# Patient Record
Sex: MALE | Race: WHITE | NOT HISPANIC OR LATINO | Employment: FULL TIME | URBAN - METROPOLITAN AREA
[De-identification: names, ages, dates, MRNs, and addresses within clinical notes are randomized per-mention and may not be internally consistent; named-entity substitution may affect disease eponyms.]

---

## 2023-02-21 PROBLEM — R03.0 BLOOD PRESSURE ELEVATED WITHOUT HISTORY OF HTN: Status: ACTIVE | Noted: 2023-02-21

## 2023-02-21 PROBLEM — E03.9 HYPOTHYROID: Status: ACTIVE | Noted: 2023-02-21

## 2023-02-21 PROBLEM — I10 BENIGN ESSENTIAL HTN: Status: ACTIVE | Noted: 2023-02-21

## 2023-02-21 PROBLEM — R19.7 DIARRHEA: Status: ACTIVE | Noted: 2023-02-21

## 2023-02-21 PROBLEM — K58.9 IBS (IRRITABLE BOWEL SYNDROME): Status: ACTIVE | Noted: 2023-02-21

## 2023-02-21 PROBLEM — R10.9 ABDOMINAL DISCOMFORT: Status: ACTIVE | Noted: 2023-02-21

## 2023-02-21 PROBLEM — F41.9 ANXIETY DISORDER, UNSPECIFIED: Status: ACTIVE | Noted: 2023-02-21

## 2023-02-21 PROBLEM — E78.5 ELEVATED LIPIDS: Status: ACTIVE | Noted: 2023-02-21

## 2023-02-21 PROBLEM — K57.30 COLON, DIVERTICULOSIS: Status: ACTIVE | Noted: 2023-02-21

## 2023-02-21 PROBLEM — C61 ADENOCARCINOMA OF PROSTATE (MULTI): Status: ACTIVE | Noted: 2023-02-21

## 2023-02-21 RX ORDER — LEVOTHYROXINE SODIUM 50 UG/1
1 CAPSULE ORAL
COMMUNITY
End: 2024-01-11 | Stop reason: SDUPTHER

## 2023-02-21 RX ORDER — AMLODIPINE BESYLATE 5 MG/1
1 TABLET ORAL DAILY
COMMUNITY
Start: 2022-05-02 | End: 2023-06-02 | Stop reason: SDUPTHER

## 2023-03-13 DIAGNOSIS — E78.5 ELEVATED LIPIDS: ICD-10-CM

## 2023-03-13 DIAGNOSIS — I10 BENIGN ESSENTIAL HTN: ICD-10-CM

## 2023-03-13 DIAGNOSIS — E03.9 HYPOTHYROIDISM, UNSPECIFIED TYPE: ICD-10-CM

## 2023-03-19 ASSESSMENT — PROMIS GLOBAL HEALTH SCALE
RATE_QUALITY_OF_LIFE: EXCELLENT
RATE_PHYSICAL_HEALTH: VERY GOOD
RATE_SOCIAL_SATISFACTION: EXCELLENT
CARRYOUT_PHYSICAL_ACTIVITIES: COMPLETELY
CARRYOUT_SOCIAL_ACTIVITIES: EXCELLENT
EMOTIONAL_PROBLEMS: SOMETIMES
RATE_AVERAGE_FATIGUE: MILD
RATE_MENTAL_HEALTH: VERY GOOD
RATE_GENERAL_HEALTH: VERY GOOD
RATE_AVERAGE_PAIN: 2

## 2023-03-20 ENCOUNTER — LAB (OUTPATIENT)
Dept: LAB | Facility: LAB | Age: 58
End: 2023-03-20
Payer: COMMERCIAL

## 2023-03-20 DIAGNOSIS — E03.9 HYPOTHYROIDISM, UNSPECIFIED TYPE: ICD-10-CM

## 2023-03-20 DIAGNOSIS — E78.5 ELEVATED LIPIDS: ICD-10-CM

## 2023-03-20 DIAGNOSIS — I10 BENIGN ESSENTIAL HTN: ICD-10-CM

## 2023-03-20 LAB
BASOPHILS (10*3/UL) IN BLOOD BY AUTOMATED COUNT: 0.05 X10E9/L (ref 0–0.1)
BASOPHILS/100 LEUKOCYTES IN BLOOD BY AUTOMATED COUNT: 1 % (ref 0–2)
EOSINOPHILS (10*3/UL) IN BLOOD BY AUTOMATED COUNT: 0.43 X10E9/L (ref 0–0.7)
EOSINOPHILS/100 LEUKOCYTES IN BLOOD BY AUTOMATED COUNT: 8.8 % (ref 0–6)
ERYTHROCYTE DISTRIBUTION WIDTH (RATIO) BY AUTOMATED COUNT: 12 % (ref 11.5–14.5)
ERYTHROCYTE MEAN CORPUSCULAR HEMOGLOBIN CONCENTRATION (G/DL) BY AUTOMATED: 33.3 G/DL (ref 32–36)
ERYTHROCYTE MEAN CORPUSCULAR VOLUME (FL) BY AUTOMATED COUNT: 100 FL (ref 80–100)
ERYTHROCYTES (10*6/UL) IN BLOOD BY AUTOMATED COUNT: 4.5 X10E12/L (ref 4.5–5.9)
HEMATOCRIT (%) IN BLOOD BY AUTOMATED COUNT: 45 % (ref 41–52)
HEMOGLOBIN (G/DL) IN BLOOD: 15 G/DL (ref 13.5–17.5)
IMMATURE GRANULOCYTES/100 LEUKOCYTES IN BLOOD BY AUTOMATED COUNT: 0.2 % (ref 0–0.9)
LEUKOCYTES (10*3/UL) IN BLOOD BY AUTOMATED COUNT: 4.9 X10E9/L (ref 4.4–11.3)
LYMPHOCYTES (10*3/UL) IN BLOOD BY AUTOMATED COUNT: 1.36 X10E9/L (ref 1.2–4.8)
LYMPHOCYTES/100 LEUKOCYTES IN BLOOD BY AUTOMATED COUNT: 27.7 % (ref 13–44)
MONOCYTES (10*3/UL) IN BLOOD BY AUTOMATED COUNT: 0.37 X10E9/L (ref 0.1–1)
MONOCYTES/100 LEUKOCYTES IN BLOOD BY AUTOMATED COUNT: 7.5 % (ref 2–10)
NEUTROPHILS (10*3/UL) IN BLOOD BY AUTOMATED COUNT: 2.69 X10E9/L (ref 1.2–7.7)
NEUTROPHILS/100 LEUKOCYTES IN BLOOD BY AUTOMATED COUNT: 54.8 % (ref 40–80)
NRBC (PER 100 WBCS) BY AUTOMATED COUNT: 0 /100 WBC (ref 0–0)
PLATELETS (10*3/UL) IN BLOOD AUTOMATED COUNT: 298 X10E9/L (ref 150–450)
RBC, URINE: <1 /HPF (ref 0–5)
THYROTROPIN (MIU/L) IN SER/PLAS BY DETECTION LIMIT <= 0.05 MIU/L: 2.66 MIU/L (ref 0.44–3.98)
WBC, URINE: NORMAL /HPF (ref 0–5)

## 2023-03-20 PROCEDURE — 80053 COMPREHEN METABOLIC PANEL: CPT

## 2023-03-20 PROCEDURE — 36415 COLL VENOUS BLD VENIPUNCTURE: CPT

## 2023-03-20 PROCEDURE — 81001 URINALYSIS AUTO W/SCOPE: CPT

## 2023-03-20 PROCEDURE — 85025 COMPLETE CBC W/AUTO DIFF WBC: CPT

## 2023-03-20 PROCEDURE — 80061 LIPID PANEL: CPT

## 2023-03-20 PROCEDURE — 84443 ASSAY THYROID STIM HORMONE: CPT

## 2023-03-21 LAB
ALANINE AMINOTRANSFERASE (SGPT) (U/L) IN SER/PLAS: 17 U/L (ref 10–52)
ALBUMIN (G/DL) IN SER/PLAS: 4.6 G/DL (ref 3.4–5)
ALKALINE PHOSPHATASE (U/L) IN SER/PLAS: 49 U/L (ref 33–120)
ANION GAP IN SER/PLAS: 11 MMOL/L (ref 10–20)
ASPARTATE AMINOTRANSFERASE (SGOT) (U/L) IN SER/PLAS: 16 U/L (ref 9–39)
BILIRUBIN TOTAL (MG/DL) IN SER/PLAS: 0.4 MG/DL (ref 0–1.2)
CALCIUM (MG/DL) IN SER/PLAS: 10.1 MG/DL (ref 8.6–10.6)
CARBON DIOXIDE, TOTAL (MMOL/L) IN SER/PLAS: 31 MMOL/L (ref 21–32)
CHLORIDE (MMOL/L) IN SER/PLAS: 104 MMOL/L (ref 98–107)
CHOLESTEROL (MG/DL) IN SER/PLAS: 212 MG/DL (ref 0–199)
CHOLESTEROL IN HDL (MG/DL) IN SER/PLAS: 52.5 MG/DL
CHOLESTEROL/HDL RATIO: 4
CREATININE (MG/DL) IN SER/PLAS: 0.9 MG/DL (ref 0.5–1.3)
GFR MALE: >90 ML/MIN/1.73M2
GLUCOSE (MG/DL) IN SER/PLAS: 91 MG/DL (ref 74–99)
LDL: 135 MG/DL (ref 0–99)
POTASSIUM (MMOL/L) IN SER/PLAS: 5.2 MMOL/L (ref 3.5–5.3)
PROTEIN TOTAL: 7 G/DL (ref 6.4–8.2)
SODIUM (MMOL/L) IN SER/PLAS: 141 MMOL/L (ref 136–145)
TRIGLYCERIDE (MG/DL) IN SER/PLAS: 123 MG/DL (ref 0–149)
UREA NITROGEN (MG/DL) IN SER/PLAS: 14 MG/DL (ref 6–23)
VLDL: 25 MG/DL (ref 0–40)

## 2023-03-23 ENCOUNTER — OFFICE VISIT (OUTPATIENT)
Dept: PRIMARY CARE | Facility: CLINIC | Age: 58
End: 2023-03-23
Payer: COMMERCIAL

## 2023-03-23 VITALS
DIASTOLIC BLOOD PRESSURE: 80 MMHG | WEIGHT: 150.8 LBS | OXYGEN SATURATION: 98 % | HEART RATE: 79 BPM | HEIGHT: 69 IN | RESPIRATION RATE: 18 BRPM | SYSTOLIC BLOOD PRESSURE: 130 MMHG | BODY MASS INDEX: 22.33 KG/M2

## 2023-03-23 DIAGNOSIS — R19.7 DIARRHEA, UNSPECIFIED TYPE: Primary | ICD-10-CM

## 2023-03-23 PROCEDURE — 3079F DIAST BP 80-89 MM HG: CPT | Performed by: INTERNAL MEDICINE

## 2023-03-23 PROCEDURE — 99396 PREV VISIT EST AGE 40-64: CPT | Performed by: INTERNAL MEDICINE

## 2023-03-23 PROCEDURE — 3075F SYST BP GE 130 - 139MM HG: CPT | Performed by: INTERNAL MEDICINE

## 2023-03-23 ASSESSMENT — ENCOUNTER SYMPTOMS
EYES NEGATIVE: 1
DEPRESSION: 0
OCCASIONAL FEELINGS OF UNSTEADINESS: 0
LOSS OF SENSATION IN FEET: 0
RESPIRATORY NEGATIVE: 1
CONSTITUTIONAL NEGATIVE: 1
ABDOMINAL PAIN: 1
ALLERGIC/IMMUNOLOGIC NEGATIVE: 1
PSYCHIATRIC NEGATIVE: 1
NEUROLOGICAL NEGATIVE: 1
DIARRHEA: 1
CARDIOVASCULAR NEGATIVE: 1
HEMATOLOGIC/LYMPHATIC NEGATIVE: 1
ENDOCRINE NEGATIVE: 1

## 2023-03-23 NOTE — PROGRESS NOTES
"Subjective   Patient ID: Bj Herndon is a 57 y.o. male who presents for Annual Exam.    CPE    57 M    CA Prostate     ? Functional GI Dz    Anxiety    HTN    Hypothyroid    HPI     Review of Systems   Constitutional: Negative.    HENT: Negative.     Eyes: Negative.    Respiratory: Negative.     Cardiovascular: Negative.    Gastrointestinal:  Positive for abdominal pain and diarrhea.   Endocrine: Negative.    Genitourinary: Negative.    Skin: Negative.    Allergic/Immunologic: Negative.    Neurological: Negative.    Hematological: Negative.    Psychiatric/Behavioral: Negative.         Objective   Pulse 79   Resp 18   Ht 1.753 m (5' 9\")   Wt 68.4 kg (150 lb 12.8 oz)   SpO2 98%   BMI 22.27 kg/m²     Physical Exam  Constitutional:       Appearance: Normal appearance.   HENT:      Head: Normocephalic and atraumatic.      Right Ear: Tympanic membrane, ear canal and external ear normal.      Left Ear: Tympanic membrane, ear canal and external ear normal.      Nose: Nose normal.      Mouth/Throat:      Mouth: Mucous membranes are moist.   Eyes:      Extraocular Movements: Extraocular movements intact.      Conjunctiva/sclera: Conjunctivae normal.      Pupils: Pupils are equal, round, and reactive to light.   Cardiovascular:      Rate and Rhythm: Normal rate and regular rhythm.      Pulses: Normal pulses.      Heart sounds: Normal heart sounds.   Pulmonary:      Effort: Pulmonary effort is normal.      Breath sounds: Normal breath sounds.   Abdominal:      General: Abdomen is flat. Bowel sounds are normal.      Palpations: Abdomen is soft.   Genitourinary:     Rectum: Normal.   Musculoskeletal:         General: Normal range of motion.      Cervical back: Normal range of motion.   Skin:     General: Skin is warm.   Neurological:      General: No focal deficit present.      Mental Status: He is alert and oriented to person, place, and time.   Psychiatric:         Mood and Affect: Mood normal.         Behavior: Behavior " normal.         Assessment/Plan          CA Prostate     ? Functional GI Dz     SIBO    Anxiety    HTN    Hypothyroid    Plan:    Breath Test    ? SSRI/TCA  for ? Functional bowel syndrome    Continue with current treatment.    Follow up in 12 months/PRN

## 2023-05-10 ENCOUNTER — TELEPHONE (OUTPATIENT)
Dept: PRIMARY CARE | Facility: CLINIC | Age: 58
End: 2023-05-10
Payer: COMMERCIAL

## 2023-05-10 NOTE — TELEPHONE ENCOUNTER
Patient said that the symptoms are getting worse and he was wondering if [facility] would be ok with him taking rifaxamin. He stated that he has seen his GI doctor and he was just told that he has IBS. He would like to try this medication for two weeks to see if it will help because he is losing weight, having soft stools and his appetite comes and goes. I informed patient that I will talk to the doctor and give him a call back.       Patient was identified with full name and date of birth.       Rosy Ram MA

## 2023-06-02 DIAGNOSIS — I10 BENIGN ESSENTIAL HTN: ICD-10-CM

## 2023-06-02 RX ORDER — AMLODIPINE BESYLATE 5 MG/1
5 TABLET ORAL DAILY
Qty: 30 TABLET | Refills: 2 | Status: SHIPPED | OUTPATIENT
Start: 2023-06-02 | End: 2023-09-01

## 2023-08-28 LAB — PROSTATE SPECIFIC AG (NG/ML) IN SER/PLAS: 0.32 NG/ML (ref 0–4)

## 2023-09-01 DIAGNOSIS — I10 BENIGN ESSENTIAL HTN: ICD-10-CM

## 2023-09-01 RX ORDER — AMLODIPINE BESYLATE 5 MG/1
5 TABLET ORAL DAILY
Qty: 90 TABLET | Refills: 1 | Status: SHIPPED | OUTPATIENT
Start: 2023-09-01 | End: 2024-01-11 | Stop reason: SDUPTHER

## 2024-01-11 ENCOUNTER — OFFICE VISIT (OUTPATIENT)
Dept: PRIMARY CARE | Facility: HOSPITAL | Age: 59
End: 2024-01-11
Payer: COMMERCIAL

## 2024-01-11 VITALS
WEIGHT: 143.3 LBS | BODY MASS INDEX: 22.49 KG/M2 | HEART RATE: 60 BPM | OXYGEN SATURATION: 98 % | SYSTOLIC BLOOD PRESSURE: 153 MMHG | HEIGHT: 67 IN | DIASTOLIC BLOOD PRESSURE: 68 MMHG | TEMPERATURE: 97.4 F

## 2024-01-11 DIAGNOSIS — I10 BENIGN ESSENTIAL HTN: ICD-10-CM

## 2024-01-11 DIAGNOSIS — C61 PROSTATE CANCER (MULTI): ICD-10-CM

## 2024-01-11 DIAGNOSIS — E03.9 HYPOTHYROIDISM, UNSPECIFIED TYPE: Primary | ICD-10-CM

## 2024-01-11 DIAGNOSIS — E78.5 ELEVATED LIPIDS: ICD-10-CM

## 2024-01-11 PROCEDURE — 3078F DIAST BP <80 MM HG: CPT | Performed by: INTERNAL MEDICINE

## 2024-01-11 PROCEDURE — 99215 OFFICE O/P EST HI 40 MIN: CPT | Performed by: INTERNAL MEDICINE

## 2024-01-11 PROCEDURE — 3077F SYST BP >= 140 MM HG: CPT | Performed by: INTERNAL MEDICINE

## 2024-01-11 PROCEDURE — 1036F TOBACCO NON-USER: CPT | Performed by: INTERNAL MEDICINE

## 2024-01-11 RX ORDER — AMLODIPINE BESYLATE 5 MG/1
5 TABLET ORAL DAILY
Qty: 30 TABLET | Refills: 11 | Status: SHIPPED | OUTPATIENT
Start: 2024-01-11

## 2024-01-11 RX ORDER — LEVOTHYROXINE SODIUM 50 UG/1
50 CAPSULE ORAL
Qty: 30 CAPSULE | Refills: 11 | Status: SHIPPED | OUTPATIENT
Start: 2024-01-11 | End: 2024-01-12

## 2024-01-11 ASSESSMENT — ENCOUNTER SYMPTOMS
DEPRESSION: 0
LOSS OF SENSATION IN FEET: 0
OCCASIONAL FEELINGS OF UNSTEADINESS: 0

## 2024-01-11 ASSESSMENT — PATIENT HEALTH QUESTIONNAIRE - PHQ9
2. FEELING DOWN, DEPRESSED OR HOPELESS: NOT AT ALL
1. LITTLE INTEREST OR PLEASURE IN DOING THINGS: NOT AT ALL
SUM OF ALL RESPONSES TO PHQ9 QUESTIONS 1 AND 2: 0

## 2024-01-11 NOTE — PATIENT INSTRUCTIONS
Take levothyroxine every other day for 2 weeks and then contact the office    Will continue taper if not having symptoms    Blood work after stopping levothyroxine

## 2024-01-11 NOTE — PROGRESS NOTES
Chief Complaint   Patient presents with    Establish Care     Medication refill         History Of Present Illness:    jB Herndon is a 58 y.o. male presenting to [facility] and refill medications.    Needs levothyroxine refilled.  Was diagnosed with hypothyroidism over 5 years ago.  Diagnosed on routine blood work.  Never really symptomatic.  Questions if he needs to continue taking the  medication.   He has been out of the medication for a week and does not feel any differently.  Has history of IBS-D.  Evaluated by GI in past.  Colonoscopy, CT abdomen pelvis and RUQ US.  Gallbladder polyps but not other concerning findings.  No response to anti-spasmodics.  Temp relief from course of Xifaxan.   Low-normal fecal elastase.  Ne response to creon.  Hx of PCa treated with brachytherapy.  Sees Dr. Shepard for follow up.      CT abd/pelvis 5/15/19  RUQUS 5/4/22  Several tiny gallbladder polyps measuring 3 mm or less in individual  diameter which are most likely benign. No shadowing gallstones or  wall thickening. No biliary dilation.    2/24/22  CAC 4  2019 brachytherapy for 3+4 PCa - some urgency, normal erections       Active Medical Problems:  Patient Active Problem List    Diagnosis Date Noted    Abdominal discomfort 02/21/2023    Prostate cancer (CMS/HCC) 02/21/2023    Benign essential HTN 02/21/2023    Blood pressure elevated without history of HTN 02/21/2023    Colon, diverticulosis 02/21/2023    Diarrhea 02/21/2023    Elevated lipids 02/21/2023    Hypothyroid 02/21/2023    IBS (irritable bowel syndrome) 02/21/2023    Anxiety disorder, unspecified 02/21/2023       Past Medical History:  Past Medical History:   Diagnosis Date    Disease of thyroid gland     hypothyroidism - diagnosed on routine blood work    Elevated LDL cholesterol level     CAC 0 - CAC 4 -2/24/22    Gallbladder polyp     Hypertension     Other conditions influencing health status     No significant past medical history    Prostate cancer  "(CMS/[facility]) 11/2019    shant 3+4 - Radiation - brachytherapy, ([facility], Dr. Eason, Dr Rosales) Dr. Shepard       Past Surgical History:  Past Surgical History:   Procedure Laterality Date    OTHER SURGICAL HISTORY  09/16/2021    Vasectomy    OTHER SURGICAL HISTORY  09/16/2021    Hernia repair - right inguinal    OTHER SURGICAL HISTORY  09/16/2021    Prostate brachytherapy         Social History:  Social History     Tobacco Use    Smoking status: Never    Smokeless tobacco: Never   Vaping Use    Vaping Use: Never used   Substance Use Topics    Alcohol use: Not Currently     Alcohol/week: 2.0 standard drinks of alcohol     Types: 2 Cans of beer per week     Comment: occasional    Drug use: Never         Family History:  Family History   Problem Relation Name Age of Onset    Dementia Mother      Other (prostate enlargement) Father      Diabetes type II Father      No Known Problems Daughter          Looking at [employer] [facility] is first choice    Other (acute angina) Maternal Great-Grandfather          Allergies:  Prednisone    Outpatient Medications:    Current Outpatient Medications:     amLODIPine (Norvasc) 5 mg tablet, Take 1 tablet (5 mg) by mouth once daily., Disp: 30 tablet, Rfl: 11    levothyroxine (LevoxyL) 25 mcg tablet, Take 2 tablets (50 mcg) by mouth once daily in the morning. Take before meals., Disp: 30 tablet, Rfl: 5    Review of Systems:  Pertinent positives in review of systems outlined above.  Complete ROS otherwise negative.           Last Recorded Vitals:  Vitals:    01/11/24 1111   BP: 153/68   BP Location: Left arm   Patient Position: Sitting   BP Cuff Size: Adult   Pulse: 60   Temp: 36.3 °C (97.4 °F)   SpO2: 98%   Weight: 65 kg (143 lb 4.8 oz)   Height: 1.702 m (5' 7\")   Body mass index is 22.44 kg/m².        Physical Exam  Vitals reviewed.   Constitutional:       Appearance: Normal appearance.   HENT:      Mouth/Throat:      Mouth: Mucous membranes are moist.      Pharynx: Oropharynx is " clear.   Eyes:      Extraocular Movements: Extraocular movements intact.      Conjunctiva/sclera: Conjunctivae normal.      Pupils: Pupils are equal, round, and reactive to light.   Neck:      Vascular: No carotid bruit.   Cardiovascular:      Rate and Rhythm: Normal rate and regular rhythm.   Pulmonary:      Effort: Pulmonary effort is normal.      Breath sounds: Normal breath sounds.   Musculoskeletal:      Cervical back: No tenderness.      Right lower leg: No edema.      Left lower leg: No edema.   Lymphadenopathy:      Cervical: No cervical adenopathy.   Neurological:      General: No focal deficit present.      Mental Status: He is alert and oriented to person, place, and time.   Psychiatric:         Mood and Affect: Mood normal.             Last Labs:    The 10-year ASCVD risk score (Hilda TIPTON, et al., 2019) is: 13.8%    Values used to calculate the score:      Age: 58 years      Sex: Male      Is Non- : No      Diabetic: No      Tobacco smoker: No      Systolic Blood Pressure: 153 mmHg      Is BP treated: Yes      HDL Cholesterol: 52.5 mg/dL      Total Cholesterol: 212 mg/dL        Assessment/Plan   Problem List Items Addressed This Visit       Prostate cancer (CMS/[facility])     PSA UTD.  Followed by Dr. Shepard         Benign essential HTN     BP elevated today.  May be anxious.  Will continue amlodipine and repeat BP in 3mo         Relevant Medications    amLODIPine (Norvasc) 5 mg tablet    Other Relevant Orders    Comprehensive Metabolic Panel    Elevated lipids     Fasting lipids due now.          Relevant Orders    Lipid Panel    Hypothyroid - Primary     Highest TSH in chart was 7.  Bj does not remember having any symptoms of underactive thryoid.  Off of medication and no change in how he is feeling.  Time course of IBS symptoms parallels dx and treatment of hypothyroidism.  ? If levothyroxine is contributing.   Will taper dose to every other day for 2 week and the check in.  If  not symptomatic, will continue full taper off of the levothyroxine.  Bj is interested to see how he feels off of the medication.   He agrees to the slow taper.          Relevant Orders    Comprehensive Metabolic Panel    TSH with reflex to Free T4 if abnormal    Free T4 Index    Thyroid Peroxidase (TPO) Antibody       A total of 60 min was spent reviewing the chart and recent testing and discussing plan of care.       Domingo Rowland MD

## 2024-01-12 DIAGNOSIS — E03.9 HYPOTHYROIDISM, UNSPECIFIED TYPE: ICD-10-CM

## 2024-01-12 RX ORDER — LEVOTHYROXINE SODIUM 25 UG/1
50 TABLET ORAL
Qty: 30 TABLET | Refills: 5 | Status: SHIPPED | OUTPATIENT
Start: 2024-01-12 | End: 2024-01-23 | Stop reason: SDUPTHER

## 2024-01-13 NOTE — ASSESSMENT & PLAN NOTE
Highest TSH in chart was 7.  Bj does not remember having any symptoms of underactive thryoid.  Off of medication and no change in how he is feeling.  Time course of IBS symptoms parallels dx and treatment of hypothyroidism.  ? If levothyroxine is contributing.   Will taper dose to every other day for 2 week and the check in.  If not symptomatic, will continue full taper off of the levothyroxine.  Bj is interested to see how he feels off of the medication.   He agrees to the slow taper.

## 2024-01-20 DIAGNOSIS — E03.9 HYPOTHYROIDISM, UNSPECIFIED TYPE: Primary | ICD-10-CM

## 2024-01-23 RX ORDER — LEVOTHYROXINE SODIUM 25 UG/1
50 TABLET ORAL
Qty: 60 TABLET | Refills: 0 | Status: SHIPPED | OUTPATIENT
Start: 2024-01-23 | End: 2024-03-13 | Stop reason: ALTCHOICE

## 2024-02-26 ENCOUNTER — LAB (OUTPATIENT)
Dept: LAB | Facility: LAB | Age: 59
End: 2024-02-26
Payer: COMMERCIAL

## 2024-02-26 DIAGNOSIS — C61 PROSTATE CANCER (MULTI): ICD-10-CM

## 2024-02-26 LAB — PSA SERPL-MCNC: 0.29 NG/ML

## 2024-02-26 PROCEDURE — 84479 ASSAY OF THYROID (T3 OR T4): CPT

## 2024-02-26 PROCEDURE — 84439 ASSAY OF FREE THYROXINE: CPT

## 2024-02-26 PROCEDURE — 84153 ASSAY OF PSA TOTAL: CPT

## 2024-02-26 PROCEDURE — 36415 COLL VENOUS BLD VENIPUNCTURE: CPT

## 2024-02-26 PROCEDURE — 80053 COMPREHEN METABOLIC PANEL: CPT

## 2024-02-26 PROCEDURE — 86376 MICROSOMAL ANTIBODY EACH: CPT

## 2024-02-26 PROCEDURE — 80061 LIPID PANEL: CPT

## 2024-02-26 PROCEDURE — 84436 ASSAY OF TOTAL THYROXINE: CPT

## 2024-02-26 PROCEDURE — 84443 ASSAY THYROID STIM HORMONE: CPT

## 2024-02-29 NOTE — PROGRESS NOTES
Virtual or Telephone Consent    An interactive audio and video telecommunication system which permits real time communications between the patient (at the originating site) and provider (at the distant site) was utilized to provide this telehealth service.   Verbal consent was requested and obtained from Bj Herndon on this date, 03/01/24 for a telehealth visit.     FUV    Last seen - 8/31/23     HISTORY OF PRESENT ILLNESS:   Bj Herndon is a 58 y.o. male who is being seen today for 6 MONTH FUV     -history of prostate cancer, diagnosed with GS 3+4=7 in 2019 treated with radiation     PSA trend:  -0.29 on 2/26/24  -0.32 on 8/28/23,   -0.53 on 2/1/23,  -0.61 on 7/13/2022     PAST MEDICAL HISTORY:  Past Medical History:   Diagnosis Date    Disease of thyroid gland     hypothyroidism - diagnosed on routine blood work    Elevated LDL cholesterol level     CAC 0 - CAC 4 -2/24/22    Gallbladder polyp     Hypertension     Other conditions influencing health status     No significant past medical history    Prostate cancer (CMS/[facility]) 11/2019    shant 3+4 - Radiation - brachytherapy, ([facility], Dr. Eason, Dr Rosales) Dr. Shepard       PAST SURGICAL HISTORY:  Past Surgical History:   Procedure Laterality Date    OTHER SURGICAL HISTORY  09/16/2021    Vasectomy    OTHER SURGICAL HISTORY  09/16/2021    Hernia repair - right inguinal    OTHER SURGICAL HISTORY  09/16/2021    Prostate brachytherapy        ALLERGIES:   Allergies   Allergen Reactions    Prednisone Confusion     Treatment for otitis followed by hearing loss - agitated,coulnd't sleep        MEDICATIONS:   Current Outpatient Medications   Medication Instructions    amLODIPine (NORVASC) 5 mg, oral, Daily    levothyroxine (SYNTHROID, LEVOXYL) 50 mcg, oral, Daily before breakfast        PHYSICAL EXAM:  There were no vitals taken for this visit.  Constitutional: Patient appears well-developed and well-nourished. No distress.    Pulmonary/Chest: Effort normal.  "No respiratory distress.   Abdominal: Soft, ND NT  : WNL  Musculoskeletal: Normal range of motion.    Neurological: Alert and oriented to person, place, and time.  Psychiatric: Normal mood and affect. Behavior is normal. Thought content normal.      Labs:  No results found for: \"TESTOSTERONE\"  Lab Results   Component Value Date    PSA 0.29 02/26/2024     No components found for: \"CBC\"  Lab Results   Component Value Date    CREATININE 0.91 02/26/2024     No components found for: \"TESTOTMS\"  No results found for: \"TESTF\"    Imaging:    Discussion: PSA Results reviewed with patient. Patient reassured. Denies hematuria, dysuria, nocturia, flank pain, and bothersome frequency or urgency. No new urinary complaints. Denies leakage. Follow up in 6 months with PSA prior.       Assessment:    No diagnosis found.    Bj Herndon is a 58 y.o. male here for FUV     Plan:   1) Follow up in 6 months with PSA prior.  All questions and concerns were addressed. Patient verbalizes understanding and has no other questions at this time.     Scribe Attestation  By signing my name below, IRenetta , [place]   attest that this documentation has been prepared under the direction and in the presence of Prashant Shepard MD.    "

## 2024-03-01 ENCOUNTER — TELEMEDICINE (OUTPATIENT)
Dept: UROLOGY | Facility: HOSPITAL | Age: 59
End: 2024-03-01
Payer: COMMERCIAL

## 2024-03-01 DIAGNOSIS — C61 MALIGNANT NEOPLASM OF PROSTATE (MULTI): Primary | ICD-10-CM

## 2024-03-01 PROCEDURE — G2211 COMPLEX E/M VISIT ADD ON: HCPCS | Performed by: UROLOGY

## 2024-03-01 PROCEDURE — 1036F TOBACCO NON-USER: CPT | Performed by: UROLOGY

## 2024-03-01 PROCEDURE — 99213 OFFICE O/P EST LOW 20 MIN: CPT | Performed by: UROLOGY

## 2024-03-13 ENCOUNTER — OFFICE VISIT (OUTPATIENT)
Dept: PRIMARY CARE | Facility: CLINIC | Age: 59
End: 2024-03-13
Payer: COMMERCIAL

## 2024-03-13 VITALS
TEMPERATURE: 97.8 F | WEIGHT: 146.2 LBS | DIASTOLIC BLOOD PRESSURE: 70 MMHG | HEART RATE: 55 BPM | OXYGEN SATURATION: 98 % | SYSTOLIC BLOOD PRESSURE: 110 MMHG | BODY MASS INDEX: 22.9 KG/M2

## 2024-03-13 DIAGNOSIS — E03.8 OTHER SPECIFIED HYPOTHYROIDISM: ICD-10-CM

## 2024-03-13 DIAGNOSIS — E03.9 HYPOTHYROIDISM, UNSPECIFIED TYPE: ICD-10-CM

## 2024-03-13 DIAGNOSIS — Z00.00 ROUTINE HEALTH MAINTENANCE: Primary | ICD-10-CM

## 2024-03-13 DIAGNOSIS — C61 PROSTATE CANCER (MULTI): ICD-10-CM

## 2024-03-13 DIAGNOSIS — I10 HYPERTENSION, UNSPECIFIED TYPE: ICD-10-CM

## 2024-03-13 PROBLEM — K82.4 GALLBLADDER POLYP: Status: ACTIVE | Noted: 2024-03-13

## 2024-03-13 PROBLEM — R03.0 BLOOD PRESSURE ELEVATED WITHOUT HISTORY OF HTN: Status: RESOLVED | Noted: 2023-02-21 | Resolved: 2024-03-13

## 2024-03-13 PROBLEM — E78.00 ELEVATED LDL CHOLESTEROL LEVEL: Status: RESOLVED | Noted: 2024-03-13 | Resolved: 2024-03-13

## 2024-03-13 PROBLEM — E78.00 ELEVATED LDL CHOLESTEROL LEVEL: Status: ACTIVE | Noted: 2024-03-13

## 2024-03-13 PROBLEM — E07.9 DISEASE OF THYROID GLAND: Status: ACTIVE | Noted: 2023-02-21

## 2024-03-13 PROCEDURE — 3074F SYST BP LT 130 MM HG: CPT | Performed by: INTERNAL MEDICINE

## 2024-03-13 PROCEDURE — 99396 PREV VISIT EST AGE 40-64: CPT | Performed by: INTERNAL MEDICINE

## 2024-03-13 PROCEDURE — 3078F DIAST BP <80 MM HG: CPT | Performed by: INTERNAL MEDICINE

## 2024-03-13 PROCEDURE — 1036F TOBACCO NON-USER: CPT | Performed by: INTERNAL MEDICINE

## 2024-03-13 NOTE — ASSESSMENT & PLAN NOTE
Bj discontinued levothyroxine, and he is asymptomatic.  His TSH is 7, but free T4 is normal.  He will remain off of levothyroxine, and thyroid studies will be repeated in 3 to 4 months

## 2024-03-13 NOTE — PATIENT INSTRUCTIONS
Schedule eye exam     Compression stockings 20-30mm Hg     Blood work again in 3 mo     Schedule shingles vaccine     Take some ibuprofen as needed for back pain    Call if pain does not improve

## 2024-03-13 NOTE — ASSESSMENT & PLAN NOTE
Blood pressure is under excellent control.  Continue amlodipine.  Repeat blood pressure in 6 months

## 2024-03-13 NOTE — PROGRESS NOTES
"Chief Complaint   Patient presents with    Follow-up         History Of Present Illness:    Bj Herndon is a 58 y.o. male presenting for yearly physical and update of health maintenance.     HISTORY OF PRESENT ILLNESS 1/11/24:  Was diagnosed with hypothyroidism over 5 years ago.  Diagnosed on routine blood work.  Never really symptomatic.  Has history of IBS-D.  Evaluated by GI in past.  Colonoscopy, CT abdomen pelvis and RUQ US.  Gallbladder polyps but not other concerning findings.  No response to anti-spasmodics.  Temp relief from course of Xifaxan.   Low-normal fecal elastase.  Ne response to creon.  Hx of PCa treated with brachytherapy.  Sees Dr. Shepard for follow up.      -CT abd/pelvis 5/15/19  RUQUS 5/4/22  Several tiny gallbladder polyps measuring 3 mm or less in individual  diameter which are most likely benign. No shadowing gallstones or  wall thickening. No biliary dilation.    -2/24/22  CAC 4  -2019 brachytherapy for 3+4 PCa - some urgency, normal erections     INTERVAL HISTORY 3/13/24  Levothyroxine discontinued a month ago. No change in energy , no symptoms of hypothyroidism.  Recent TSH 7.1, Free T4 0.86 (0.78-1.48)  Digestive symptoms have improved over the past few weeks - might be coincidence, but correlates with stopping thyroid medication.  Symptoms started Jan 2021 after arvind Covid.  Appetite is very good  [facility] has gym open to community -  exercising1-2 x per week, walking track at brisk pace, some weights   Not OOB, not having chest pain    No new urinary symptoms - occasional urgency   Normal erections  Some mid thoracic back pain described as \"fatigue\" by end of night when on feet.  Pain does not wake him from sleep.  Pain is non-radiating.       Active Medical Problems:  Patient Active Problem List    Diagnosis Date Noted    Gallbladder polyp 03/13/2024    Routine health maintenance 03/13/2024    Abdominal discomfort 02/21/2023    Prostate cancer (CMS/HCC) 02/21/2023    " Hypertension 02/21/2023    Colon, diverticulosis 02/21/2023    Diarrhea 02/21/2023    Elevated lipids 02/21/2023    Other specified hypothyroidism 02/21/2023    IBS (irritable bowel syndrome) 02/21/2023    Anxiety disorder, unspecified 02/21/2023       Past Medical History:  Past Medical History:   Diagnosis Date    Disease of thyroid gland     hypothyroidism - diagnosed on routine blood work    Elevated LDL cholesterol level     CAC 4 -2/24/22    Gallbladder polyp     Hypertension     Prostate cancer (CMS/HCC) 11/2019    shant 3+4 - Radiation - brachytherapy, ([facility], Dr. Eason, Dr Rosales) Dr. Shepard       Past Surgical History:  Past Surgical History:   Procedure Laterality Date    OTHER SURGICAL HISTORY  09/16/2021    Vasectomy    OTHER SURGICAL HISTORY      Hernia repair - right inguinal - 1 yo    OTHER SURGICAL HISTORY  11/2019    Prostate brachytherapy         Social History:  Social History     Tobacco Use    Smoking status: Never    Smokeless tobacco: Never   Vaping Use    Vaping Use: Never used   Substance Use Topics    Alcohol use: Not Currently     Alcohol/week: 2.0 standard drinks of alcohol     Types: 2 Cans of beer per week     Comment: occasional    Drug use: Never         Family History:  Family History   Problem Relation Name Age of Onset    Dementia Mother      Other (prostate enlargement) Father      Diabetes type II Father      No Known Problems Daughter          [employer] in the fall - animation major    Other (acute angina) Maternal Great-Grandfather          Allergies:  Prednisone    Outpatient Medications:    Current Outpatient Medications:     amLODIPine (Norvasc) 5 mg tablet, Take 1 tablet (5 mg) by mouth once daily., Disp: 30 tablet, Rfl: 11    berberine/herbal complex no.18 (BERBERINE-HERBAL COMB NO.18 ORAL), Take by mouth., Disp: , Rfl:     ergocalciferol, vitamin D2, (VITAMIN D2 ORAL), Take by mouth., Disp: , Rfl:     Review of Systems:  Pertinent positives in review of  systems outlined above.  Complete ROS otherwise negative.           Last Recorded Vitals:  Vitals:    03/13/24 1004 03/13/24 1043   BP: 143/76 110/70   BP Location: Right arm    Patient Position: Sitting    BP Cuff Size: Adult    Pulse: 55    Temp: 36.6 °C (97.8 °F)    SpO2: 98%    Weight: 66.3 kg (146 lb 3.2 oz)    Body mass index is 22.9 kg/m².        Physical Exam  Vitals reviewed.   Constitutional:       Appearance: Normal appearance.   HENT:      Mouth/Throat:      Pharynx: Oropharynx is clear.   Eyes:      Extraocular Movements: Extraocular movements intact.      Conjunctiva/sclera: Conjunctivae normal.      Pupils: Pupils are equal, round, and reactive to light.   Neck:      Vascular: No carotid bruit.   Cardiovascular:      Rate and Rhythm: Normal rate and regular rhythm.   Pulmonary:      Effort: Pulmonary effort is normal.      Breath sounds: Normal breath sounds.   Abdominal:      General: Abdomen is flat. Bowel sounds are normal.      Palpations: Abdomen is soft. There is no mass.      Tenderness: There is no abdominal tenderness. There is no right CVA tenderness or left CVA tenderness.   Musculoskeletal:      Cervical back: No tenderness.      Right lower leg: No edema.      Left lower leg: No edema.   Lymphadenopathy:      Cervical: No cervical adenopathy.   Neurological:      General: No focal deficit present.      Mental Status: He is alert and oriented to person, place, and time.   Psychiatric:         Mood and Affect: Mood normal.             Last Labs:    The 10-year ASCVD risk score (Hilda DK, et al., 2019) is: 6.5%    Values used to calculate the score:      Age: 58 years      Sex: Male      Is Non- : No      Diabetic: No      Tobacco smoker: No      Systolic Blood Pressure: 110 mmHg      Is BP treated: Yes      HDL Cholesterol: 65.6 mg/dL      Total Cholesterol: 203 mg/dL        Assessment/Plan   Problem List Items Addressed This Visit       Prostate cancer (CMS/[facility])      Bj sees urologist on a regular basis.  He send PSA low and unchanged         Hypertension     Blood pressure is under excellent control.  Continue amlodipine.  Repeat blood pressure in 6 months         Other specified hypothyroidism     Bj discontinued levothyroxine, and he is asymptomatic.  His TSH is 7, but free T4 is normal.  He will remain off of levothyroxine, and thyroid studies will be repeated in 3 to 4 months         Routine health maintenance     Blood pressure is in a good range.  Recent fasting blood sugar normal.  Lipids are in good range.  Coronary artery calcium score was 4 in 2022.  Colon cancer screening is up-to-date.  Exam recommended.  We discussed vaccines, and I recommended Tdap and shingles.  Bj does not want to move forward with any vaccines at his visit today.  He will call his pharmacy to arrange his shingles vaccine.    We discussed the importance of regular aerobic exercise with a goal of 30 minutes 5 days/week, including 2 days of muscle building exercises, eating a plant-based whole food diet, getting 7 hours of restful sleep and managing stress to promote health and wellness.          Other Visit Diagnoses       Hypothyroidism, unspecified type    -  Primary    Relevant Orders    TSH with reflex to Free T4 if abnormal              A total of 60 minutes was spent reviewing the chart and recent testing and discussing plan of care.     Domingo Rowland MD

## 2024-03-13 NOTE — ASSESSMENT & PLAN NOTE
Her screening is up-to-date.  Extensive workup in the past with gastroenterology.  Symptoms are better since discontinuation of levothyroxine.  Will follow for now.

## 2024-03-13 NOTE — ASSESSMENT & PLAN NOTE
Blood pressure is in a good range.  Recent fasting blood sugar normal.  Lipids are in good range.  Coronary artery calcium score was 4 in 2022.  Colon cancer screening is up-to-date.  Exam recommended.  We discussed vaccines, and I recommended Tdap and shingles.  Bj does not want to move forward with any vaccines at his visit today.  He will call his pharmacy to arrange his shingles vaccine.    We discussed the importance of regular aerobic exercise with a goal of 30 minutes 5 days/week, including 2 days of muscle building exercises, eating a plant-based whole food diet, getting 7 hours of restful sleep and managing stress to promote health and wellness.

## 2024-03-13 NOTE — ASSESSMENT & PLAN NOTE
HDL is very high, LDL is within an acceptable range.  Coronary artery calcium score within the past 2 years was low.  Will continue with lifestyle changes including plant-based whole food diet and exercise.  Lipids to be repeated again in 1 year.

## 2024-06-06 ENCOUNTER — OFFICE VISIT (OUTPATIENT)
Dept: OTOLARYNGOLOGY | Facility: CLINIC | Age: 59
End: 2024-06-06
Payer: COMMERCIAL

## 2024-06-06 VITALS — BODY MASS INDEX: 22.73 KG/M2 | WEIGHT: 150 LBS | HEIGHT: 68 IN

## 2024-06-06 DIAGNOSIS — H91.8X3 OTHER SPECIFIED HEARING LOSS, BILATERAL: Primary | ICD-10-CM

## 2024-06-06 DIAGNOSIS — H93.13 BILATERAL TINNITUS: ICD-10-CM

## 2024-06-06 DIAGNOSIS — H61.22 IMPACTED CERUMEN OF LEFT EAR: ICD-10-CM

## 2024-06-06 DIAGNOSIS — J34.2 DEVIATED NASAL SEPTUM: ICD-10-CM

## 2024-06-06 PROCEDURE — 69210 REMOVE IMPACTED EAR WAX UNI: CPT | Performed by: OTOLARYNGOLOGY

## 2024-06-06 PROCEDURE — 99203 OFFICE O/P NEW LOW 30 MIN: CPT | Performed by: OTOLARYNGOLOGY

## 2024-06-06 PROCEDURE — 1036F TOBACCO NON-USER: CPT | Performed by: OTOLARYNGOLOGY

## 2024-06-06 NOTE — PROGRESS NOTES
"Chief Complaint   Patient presents with    Tinnitus     NP- TINNITUS LT EAR, NO AUDIO DONE TODAY     HPI:  Bj Herndon is a 58 y.o. male who has close a 10-year history of some left ear hearing loss and tinnitus.  Began after having a severe ear infection.  No significant changes although recently has had some pressure and plugged sensation of the left ear.  Tinnitus is essentially bilateral but much worse on the left.    PMH:  Past Medical History:   Diagnosis Date    Disease of thyroid gland     hypothyroidism - diagnosed on routine blood work    Elevated LDL cholesterol level     CAC 4 -2/24/22    Gallbladder polyp     Hypertension     Prostate cancer (Multi) 11/2019    shant 3+4 - Radiation - brachytherapy, ([facility], Dr. Eason, Dr Rosales) Dr. Shepard     Past Surgical History:   Procedure Laterality Date    OTHER SURGICAL HISTORY  09/16/2021    Vasectomy    OTHER SURGICAL HISTORY      Hernia repair - right inguinal - 1 yo    OTHER SURGICAL HISTORY  11/2019    Prostate brachytherapy         Medications:     Current Outpatient Medications:     amLODIPine (Norvasc) 5 mg tablet, Take 1 tablet (5 mg) by mouth once daily., Disp: 30 tablet, Rfl: 11    ergocalciferol, vitamin D2, (VITAMIN D2 ORAL), Take by mouth., Disp: , Rfl:     berberine/herbal complex no.18 (BERBERINE-HERBAL COMB NO.18 ORAL), Take by mouth., Disp: , Rfl:      Allergies:  Allergies   Allergen Reactions    Prednisone Confusion     Treatment for otitis followed by hearing loss - agitated,coulnd't sleep        ROS:  Review of systems normal unless stated otherwise in the HPI and/or PMH.    Physical Exam:  Height 1.727 m (5' 8\"), weight 68 kg (150 lb). Body mass index is 22.81 kg/m².     GENERAL APPEARANCE: Well developed and well nourished.  Alert and oriented in no acute distress.  Normal vocal quality.      HEAD/FACE: No erythema or edema or facial tenderness.  Normal facial nerve function bilaterally.    EAR:       EXTERNAL: Normal " pinnas and left obstructing wax impaction removed today with microscope and right angle hook.  Right side normal       MIDDLE EAR: Tympanic membranes intact and mobile with normal landmarks.  Middle ear space appears well aerated.       TUBE STATUS: N/A       MASTOID CAVITY: N/A       HEARING: Gross hearing assessment is within normal limits.      NOSE:       VISUALIZED USING: Anterior rhinoscopy with headlight and nasal speculum.       DORSUM: Midline, nontraumatic appearance.       MUCOSA: Normal-appearing.       SECRETIONS: Normal.       SEPTUM: Midline and nonobstructing.       INFERIOR TURBINATES: Normal.       MIDDLE TURBINATES/MEATUS: N/A       BLEEDING: N/A         ORAL CAVITY/PHARYNX:       TEETH: Adequate dentition.       TONGUE: No mass or lesion.  Normal mobility.       FLOOR OF MOUTH: No mass or lesion.       PALATE: Normal hard palate, soft palate, and uvula.       OROPHARYNX: Normal without mass or lesion.       BUCCAL MUCOSA/GBS: Normal without mass or lesion.       LIPS: Normal.    LARYNX/HYPOPHARYNX/NASOPHARYNX: N/A    NECK: No palpable masses or abnormal adenopathy.  Trachea is midline.    THYROID: No thyromegaly or palpable nodule.    SALIVARY GLANDS: Normal bilateral parotid and submandibular glands by inspection and palpation.    TMJ's: Normal.    NEURO: Cranial nerve exam grossly normal bilaterally.       Assessment/Plan   Bj was seen today for tinnitus.  Diagnoses and all orders for this visit:  Other specified hearing loss, bilateral (Primary)  Bilateral tinnitus  Impacted cerumen of left ear  Deviated nasal septum     Left ear was cleaned of wax today.  Hopefully this helps the pressure sensation.  Longstanding history of some subjective asymmetric left-sided tinnitus and hearing loss.  Recommend audiogram follow-up.  The meantime recommend chronic hearing protection and masking techniques  Follow up for audiogram.     Bj Zheng MD

## 2024-09-10 DIAGNOSIS — C61 MALIGNANT NEOPLASM OF PROSTATE (MULTI): ICD-10-CM

## 2024-09-13 ENCOUNTER — OFFICE VISIT (OUTPATIENT)
Dept: PRIMARY CARE | Facility: CLINIC | Age: 59
End: 2024-09-13
Payer: COMMERCIAL

## 2024-09-13 VITALS
SYSTOLIC BLOOD PRESSURE: 158 MMHG | BODY MASS INDEX: 23.32 KG/M2 | OXYGEN SATURATION: 96 % | TEMPERATURE: 98 F | DIASTOLIC BLOOD PRESSURE: 66 MMHG | HEART RATE: 58 BPM | WEIGHT: 153.4 LBS

## 2024-09-13 DIAGNOSIS — M67.40 MUCOID CYST OF JOINT: ICD-10-CM

## 2024-09-13 DIAGNOSIS — K58.9 IRRITABLE BOWEL SYNDROME, UNSPECIFIED TYPE: ICD-10-CM

## 2024-09-13 DIAGNOSIS — E03.9 HYPOTHYROIDISM, UNSPECIFIED TYPE: Primary | ICD-10-CM

## 2024-09-13 PROCEDURE — 3078F DIAST BP <80 MM HG: CPT | Performed by: INTERNAL MEDICINE

## 2024-09-13 PROCEDURE — 99214 OFFICE O/P EST MOD 30 MIN: CPT | Performed by: INTERNAL MEDICINE

## 2024-09-13 PROCEDURE — 1036F TOBACCO NON-USER: CPT | Performed by: INTERNAL MEDICINE

## 2024-09-13 PROCEDURE — 3077F SYST BP >= 140 MM HG: CPT | Performed by: INTERNAL MEDICINE

## 2024-09-13 NOTE — PROGRESS NOTES
"Chief Complaint:   Follow-up     History Of Present Illness:    Bj Herndon is a 58 y.o. male with active medical problems outlined below who presents for follow-up of hypothyroidism and irritable bowel syndrome.       HISTORY OF PRESENT ILLNESS 1/11/24:  Was diagnosed with hypothyroidism over 5 years ago.  Diagnosed on routine blood work.  Never really symptomatic.  Has history of IBS-D.  Evaluated by GI in past.  Colonoscopy, CT abdomen pelvis and RUQ US.  Gallbladder polyps but not other concerning findings.  No response to anti-spasmodics.  Temp relief from course of Xifaxan.   Low-normal fecal elastase.  Ne response to creon.  Hx of PCa treated with brachytherapy.  Sees Dr. Shepard for follow up.      -CT abd/pelvis 5/15/19  RUQUS 5/4/22  Several tiny gallbladder polyps measuring 3 mm or less in individual  diameter which are most likely benign. No shadowing gallstones or  wall thickening. No biliary dilation.    -2/24/22  CAC 4  -2019 brachytherapy for 3+4 PCa - some urgency, normal erections     INTERVAL HISTORY 3/13/24  Levothyroxine discontinued a month ago. No change in energy , no symptoms of hypothyroidism.  Recent TSH 7.1, Free T4 0.86 (0.78-1.48)  Digestive symptoms have improved over the past few weeks - might be coincidence, but correlates with stopping thyroid medication.  Symptoms started Jan 2021 after arvind Covid.  Appetite is very good  [facility] has gym open to community -  exercising1-2 x per week, walking track at brisk pace, some weights   Not OOB, not having chest pain    No new urinary symptoms - occasional urgency   Normal erections  Some mid thoracic back pain described as \"fatigue\" by end of night when on feet.  Pain does not wake him from sleep.  Pain is non-radiating.      INTERVAL HISTORY 9/13/24  Levothyroxine was discontinued over the past year.  No change since stopping the thyroid medication - not tired, not cold   Digestive issues are ongoing - \"way better\" than in the " past, able to regain some weight - down to 145. Having intermittent soft stools, not watery.  Going once per day, not constipated.  Some bloating.  Blood in stool.  Met with MD at [facility] spine center - Xray showed calcified granuloma in lung.  Calcified granuloma present on CAC from 4/24/22  Using compression stockings for varicosities in legs and legs less achy   He has developed a cyst at the distal interphalangeal joint in his right second digit.  The cyst is not painful.  He is interested in meeting with orthopedics to have it removed.    Patient Active Problem List   Diagnosis    Abdominal discomfort    Prostate cancer (Multi)    Hypertension    Colon, diverticulosis    Diarrhea    Elevated lipids    Hypothyroidism    IBS (irritable bowel syndrome)    Anxiety disorder, unspecified    Gallbladder polyp    Routine health maintenance    Mucoid cyst of joint       Current Outpatient Medications:     amLODIPine (Norvasc) 5 mg tablet, Take 1 tablet (5 mg) by mouth once daily., Disp: 30 tablet, Rfl: 11    ergocalciferol, vitamin D2, (VITAMIN D2 ORAL), Take by mouth., Disp: , Rfl:   Prednisone  Social History     Tobacco Use    Smoking status: Never    Smokeless tobacco: Never   Vaping Use    Vaping status: Never Used   Substance Use Topics    Alcohol use: Not Currently     Alcohol/week: 2.0 standard drinks of alcohol     Types: 2 Cans of beer per week     Comment: occasional    Drug use: Never         All pertinent aspects of medical and surgical history were reviewed and updated in chart    Review of Systems   Pertinent positives in review of systems outlined above.  Complete ROS otherwise negative.        Last Recorded Vitals:  Patient Vitals for the past 24 hrs:   BP Temp Pulse SpO2 Weight   09/13/24 1134 158/66 36.7 °C (98 °F) 58 96 % 69.6 kg (153 lb 6.4 oz)          Physical Exam  HENT:      Mouth/Throat:      Pharynx: Oropharynx is clear.   Eyes:      Extraocular Movements: Extraocular movements intact.       Conjunctiva/sclera: Conjunctivae normal.      Pupils: Pupils are equal, round, and reactive to light.   Cardiovascular:      Rate and Rhythm: Normal rate and regular rhythm.      Pulses: Normal pulses.      Heart sounds: Normal heart sounds.   Pulmonary:      Effort: Pulmonary effort is normal.      Breath sounds: Normal breath sounds.   Musculoskeletal:      Right lower leg: No edema.      Left lower leg: No edema.      Comments: Varicosities L>R          The 10-year ASCVD risk score (Hilda TIPTON, et al., 2019) is: 12%    Values used to calculate the score:      Age: 58 years      Sex: Male      Is Non- : No      Diabetic: No      Tobacco smoker: No      Systolic Blood Pressure: 158 mmHg      Is BP treated: Yes      HDL Cholesterol: 65.6 mg/dL      Total Cholesterol: 203 mg/dL        Assessment/Plan   Problem List Items Addressed This Visit       Hypothyroidism - Primary     Digestive symptoms may have improved slightly off of the levothyroxine.  Bj is feeling well otherwise and does not endorse symptoms of hypothyroidism.  Follow-up thyroid function studies will be included with his next PSA in a few weeks         IBS (irritable bowel syndrome)     Bj will begin a strict elimination diet taking out all FODMAP sources from his diet.  If the diet intervention improves his symptoms he will slowly add back food from each category one by one.         Mucoid cyst of joint     Exam consistent with benign mucoid cyst that is second distal interphalangeal joint on the right.  Reassured that it is not harmful.  I placed a referral to orthopedics and he will follow-up if it increases in size or becomes painful.         Relevant Orders    Referral to Orthopaedic Surgery       A total of 30 minutes was spent reviewing the chart and recent testing and discussing plan of care.         Domingo Rowland MD

## 2024-09-13 NOTE — ASSESSMENT & PLAN NOTE
Exam consistent with benign mucoid cyst that is second distal interphalangeal joint on the right.  Reassured that it is not harmful.  I placed a referral to orthopedics and he will follow-up if it increases in size or becomes painful.   no

## 2024-09-13 NOTE — ASSESSMENT & PLAN NOTE
Digestive symptoms may have improved slightly off of the levothyroxine.  Bj is feeling well otherwise and does not endorse symptoms of hypothyroidism.  Follow-up thyroid function studies will be included with his next PSA in a few weeks

## 2024-09-13 NOTE — ASSESSMENT & PLAN NOTE
Bj will begin a strict elimination diet taking out all FODMAP sources from his diet.  If the diet intervention improves his symptoms he will slowly add back food from each category one by one.

## 2024-09-19 ENCOUNTER — APPOINTMENT (OUTPATIENT)
Dept: OTOLARYNGOLOGY | Facility: CLINIC | Age: 59
End: 2024-09-19
Payer: COMMERCIAL

## 2024-09-19 ENCOUNTER — APPOINTMENT (OUTPATIENT)
Dept: AUDIOLOGY | Facility: CLINIC | Age: 59
End: 2024-09-19
Payer: COMMERCIAL

## 2024-09-19 VITALS — TEMPERATURE: 96.2 F | BODY MASS INDEX: 23.19 KG/M2 | WEIGHT: 153 LBS | HEIGHT: 68 IN

## 2024-09-19 DIAGNOSIS — H93.13 BILATERAL TINNITUS: ICD-10-CM

## 2024-09-19 DIAGNOSIS — H90.3 ASNHL (ASYMMETRICAL SENSORINEURAL HEARING LOSS): Primary | ICD-10-CM

## 2024-09-19 DIAGNOSIS — H90.3 ASYMMETRICAL SENSORINEURAL HEARING LOSS: Primary | ICD-10-CM

## 2024-09-19 DIAGNOSIS — H90.3 BILATERAL SENSORINEURAL HEARING LOSS: ICD-10-CM

## 2024-09-19 DIAGNOSIS — H93.13 TINNITUS OF BOTH EARS: ICD-10-CM

## 2024-09-19 PROCEDURE — 92557 COMPREHENSIVE HEARING TEST: CPT | Performed by: AUDIOLOGIST

## 2024-09-19 PROCEDURE — 92550 TYMPANOMETRY & REFLEX THRESH: CPT | Performed by: AUDIOLOGIST

## 2024-09-19 PROCEDURE — 3008F BODY MASS INDEX DOCD: CPT | Performed by: OTOLARYNGOLOGY

## 2024-09-19 PROCEDURE — 99214 OFFICE O/P EST MOD 30 MIN: CPT | Performed by: OTOLARYNGOLOGY

## 2024-09-19 PROCEDURE — 1036F TOBACCO NON-USER: CPT | Performed by: OTOLARYNGOLOGY

## 2024-09-19 NOTE — PROGRESS NOTES
AUDIOLOGY ADULT AUDIOMETRIC EVALUATION    Name:  jB Herndon  :  1965  Age:  58 y.o.  Date of Evaluation:  2024    Reason for visit: Mr. Herndon is seen in the clinic today at the request of Bj Zheng MD in otolaryngology for an audiologic evaluation.     HISTORY  The patient reported experiencing hearing loss and constant tinnitus in his left ear since .  He occasionally experiences right ear tinnitus and pressure.  Dizziness was denied.    EVALUATION  See scanned audiogram: “Media” > “Audiology Report”.      RESULTS  Otoscopic Evaluation:  Right Ear: clear ear canal  Left Ear: clear ear canal    Immittance Measures:  Tympanometry:  Right Ear: Type A, normal tympanic membrane mobility with normal middle ear pressure   Left Ear: Type A, normal tympanic membrane mobility with normal middle ear pressure     Acoustic Reflexes:  Ipsilateral Right Ear: present at normal levels at 500-4000 Hz   Ipsilateral Left Ear: present at normal levels at 500-4000 Hz   Contralateral Right Ear: did not evaluate  Contralateral Left Ear: did not evaluate    Distortion Product Otoacoustic Emissions (DPOAEs):  Right Ear: present at 0104-4890 and 5000 Hz; absent at 9184-0513 and 1204-6671 Hz  Left Ear: present at 1514-4190 Hz; absent at 1096-0911 Hz    Audiometry:  Test Technique and Reliability:   Standard audiometry via supra-aural headphones. Reliability is good.    Pure tone air and bone conduction audiometry:  Right Ear: mild to moderate sensorineural hearing loss at 9628-0016 Hz  Left Ear: mild to moderately-severe sensorineural hearing loss at 4832-8407 Hz    Speech Audiometry (Word Recognition Scores):   Right Ear: Excellent, 100% in quiet at an elevated presentation level   Left Ear: Excellent, 96% in quiet at an elevated presentation level     IMPRESSIONS  Results of today's audiometric evaluation revealed an asymmetrical sensorineural hearing loss (left ear worse than right ear).    The  presence of acoustic reflexes within normal intensity limits is consistent with normal middle ear and brainstem function, and suggests that auditory sensitivity is not significantly impaired. An elevated or absent acoustic reflex threshold is consistent with a middle ear disorder, hearing loss in the stimulated ear, and/or interruption of neural innervation of the stapedius muscle. Present DPOAEs suggest normal/near normal cochlear outer hair cell function and are consistent with no greater than a mild hearing loss at those frequencies. Absent DPOAEs are consistent with abnormal cochlear outer hair cell function at those frequencies.    RECOMMENDATIONS  - Follow up with otolaryngology today as scheduled.  - Audiologic evaluation in conjunction with otologic care, if an acute change is noted, and/or annually.  - Consider hearing aids. Contact insurance to determine if there is an applicable benefit and where it can be used. Contact our office to schedule an appointment should he wish to proceed with hearing aids through our clinic.  - Follow-up with medical care team as planned.    PATIENT EDUCATION  Discussed results, impressions and recommendations with the patient. Questions were addressed and the patient was encouraged to contact our office should concerns arise.    Time for this encounter: 1:30-2:00    Kimi Sun M.A., CCC-A f  Licensed Audiologist

## 2024-09-19 NOTE — PROGRESS NOTES
"Chief Complaint   Patient presents with    Follow-up     F/U AUDIO SOME HEARLOSS IN LT. EAR     HPI:  Bj Herndon is a 58 y.o. male who has close a 10-year history of some left ear hearing loss and tinnitus.  Began after having a severe ear infection.  No significant changes although recently has had some pressure and plugged sensation of the left ear.  Tinnitus is essentially bilateral but much worse on the left.  Presents today after having hearing test which does show bilateral moderate to severe sloping high-frequency sensorineural hearing loss.  Similar shape but a little bit worse on the left by about 15 dB.  Excellent discrimination scores and type a tympanograms.    PMH:  Past Medical History:   Diagnosis Date    Disease of thyroid gland     hypothyroidism - diagnosed on routine blood work    Elevated LDL cholesterol level     CAC 4 -2/24/22    Gallbladder polyp     Hypertension     Prostate cancer (Multi) 11/2019    shant 3+4 - Radiation - brachytherapy, ([facility], Dr. Eason, Dr Rosales) Dr. Shepard     Past Surgical History:   Procedure Laterality Date    OTHER SURGICAL HISTORY  09/16/2021    Vasectomy    OTHER SURGICAL HISTORY      Hernia repair - right inguinal - 1 yo    OTHER SURGICAL HISTORY  11/2019    Prostate brachytherapy         Medications:     Current Outpatient Medications:     amLODIPine (Norvasc) 5 mg tablet, Take 1 tablet (5 mg) by mouth once daily., Disp: 30 tablet, Rfl: 11    ergocalciferol, vitamin D2, (VITAMIN D2 ORAL), Take by mouth., Disp: , Rfl:      Allergies:  Allergies   Allergen Reactions    Prednisone Confusion     Treatment for otitis followed by hearing loss - agitated,coulnd't sleep        ROS:  Review of systems normal unless stated otherwise in the HPI and/or PMH.    Physical Exam:  Temperature 35.7 °C (96.2 °F), height 1.727 m (5' 8\"), weight 69.4 kg (153 lb). Body mass index is 23.26 kg/m².     GENERAL APPEARANCE: Well developed and well nourished.  Alert and " oriented in no acute distress.  Normal vocal quality.      HEAD/FACE: No erythema or edema or facial tenderness.  Normal facial nerve function bilaterally.    EAR:       EXTERNAL: Normal pinnas and left obstructing wax impaction removed today with microscope and right angle hook.  Right side normal       MIDDLE EAR: Tympanic membranes intact and mobile with normal landmarks.  Middle ear space appears well aerated.       TUBE STATUS: N/A       MASTOID CAVITY: N/A       HEARING: Gross hearing assessment is within normal limits.      NOSE:       VISUALIZED USING: Anterior rhinoscopy with headlight and nasal speculum.       DORSUM: Midline, nontraumatic appearance.       MUCOSA: Normal-appearing.       SECRETIONS: Normal.       SEPTUM: Midline and nonobstructing.       INFERIOR TURBINATES: Normal.       MIDDLE TURBINATES/MEATUS: N/A       BLEEDING: N/A         ORAL CAVITY/PHARYNX:       TEETH: Adequate dentition.       TONGUE: No mass or lesion.  Normal mobility.       FLOOR OF MOUTH: No mass or lesion.       PALATE: Normal hard palate, soft palate, and uvula.       OROPHARYNX: Normal without mass or lesion.       BUCCAL MUCOSA/GBS: Normal without mass or lesion.       LIPS: Normal.    LARYNX/HYPOPHARYNX/NASOPHARYNX: N/A    NECK: No palpable masses or abnormal adenopathy.  Trachea is midline.    THYROID: No thyromegaly or palpable nodule.    SALIVARY GLANDS: Normal bilateral parotid and submandibular glands by inspection and palpation.    TMJ's: Normal.    NEURO: Cranial nerve exam grossly normal bilaterally.       Assessment/Plan   Bj was seen today for follow-up.  Diagnoses and all orders for this visit:  ASNHL (asymmetrical sensorineural hearing loss) (Primary)  Bilateral sensorineural hearing loss  Bilateral tinnitus    Educated.  Keep an eye on things especially the mild asymmetry.  Rare possibility of a retrocochlear lesion such as an acoustic neuroma but even if present right now no treatment would be  indicated.  Therefore recommend we just to follow this with serial audiograms with another in 1 year.  Not interested in any hearing aids.  Recommend hearing protection and masking techniques.  Having little bit of recumbent nasal congestion at night.  Follow up in about 1 year (around 9/19/2025) for audiogram, Recheck, sooner with changes or worsening symptoms.     Bj Zheng MD

## 2024-09-30 ENCOUNTER — LAB (OUTPATIENT)
Dept: LAB | Facility: LAB | Age: 59
End: 2024-09-30
Payer: COMMERCIAL

## 2024-09-30 DIAGNOSIS — C61 MALIGNANT NEOPLASM OF PROSTATE (MULTI): ICD-10-CM

## 2024-09-30 DIAGNOSIS — E03.9 HYPOTHYROIDISM, UNSPECIFIED TYPE: ICD-10-CM

## 2024-09-30 LAB
PSA SERPL-MCNC: 0.17 NG/ML
T4 FREE SERPL-MCNC: 0.95 NG/DL (ref 0.78–1.48)
TSH SERPL-ACNC: 9.38 MIU/L (ref 0.44–3.98)

## 2024-09-30 PROCEDURE — 84153 ASSAY OF PSA TOTAL: CPT

## 2024-09-30 PROCEDURE — 84439 ASSAY OF FREE THYROXINE: CPT

## 2024-09-30 PROCEDURE — 84443 ASSAY THYROID STIM HORMONE: CPT

## 2024-09-30 PROCEDURE — 36415 COLL VENOUS BLD VENIPUNCTURE: CPT

## 2024-10-06 DIAGNOSIS — I10 HYPERTENSION, UNSPECIFIED TYPE: ICD-10-CM

## 2024-10-06 DIAGNOSIS — E03.9 HYPOTHYROIDISM, UNSPECIFIED TYPE: Primary | ICD-10-CM

## 2024-10-06 DIAGNOSIS — E78.5 ELEVATED LIPIDS: ICD-10-CM

## 2024-10-07 NOTE — PROGRESS NOTES
"  FUV    Last seen - 3/1/24     HISTORY OF PRESENT ILLNESS:   Bj Herndon is a 58 y.o. male who is being seen today for 6 MONTH FUV with PSA results   -history of prostate cancer, diagnosed with GS 3+4=7 in 11/2019 treated with brachytherapy     PSA trend:  -0.17 on 9/30/24  -0.29 on 2/26/24  -0.32 on 8/28/23,   -0.53 on 2/1/23,  -0.61 on 7/13/2022     PAST MEDICAL HISTORY:  Past Medical History:   Diagnosis Date    Disease of thyroid gland     hypothyroidism - diagnosed on routine blood work    Elevated LDL cholesterol level     CAC 4 -2/24/22    Gallbladder polyp     Hypertension     Prostate cancer (Multi) 11/2019    shant 3+4 - Radiation - brachytherapy, ([facility], Dr. Eason, Dr Rosales) Dr. Shepard       PAST SURGICAL HISTORY:  Past Surgical History:   Procedure Laterality Date    OTHER SURGICAL HISTORY  09/16/2021    Vasectomy    OTHER SURGICAL HISTORY      Hernia repair - right inguinal - 1 yo    OTHER SURGICAL HISTORY  11/2019    Prostate brachytherapy        ALLERGIES:   Allergies   Allergen Reactions    Prednisone Confusion     Treatment for otitis followed by hearing loss - agitated,coulnd't sleep        MEDICATIONS:   Current Outpatient Medications   Medication Instructions    amLODIPine (NORVASC) 5 mg, oral, Daily    ergocalciferol, vitamin D2, (VITAMIN D2 ORAL) oral        PHYSICAL EXAM:  There were no vitals taken for this visit.  Constitutional: Patient appears well-developed and well-nourished. No distress.    Pulmonary/Chest: Effort normal. No respiratory distress.   Abdominal: Soft, ND NT  : WNL  Musculoskeletal: Normal range of motion.    Neurological: Alert and oriented to person, place, and time.  Psychiatric: Normal mood and affect. Behavior is normal. Thought content normal.      Labs:  No results found for: \"TESTOSTERONE\"  Lab Results   Component Value Date    PSA 0.17 09/30/2024     No components found for: \"CBC\"  Lab Results   Component Value Date    CREATININE 0.91 02/26/2024 " "    No components found for: \"TESTOTMS\"  No results found for: \"TESTF\"    Imaging:    Discussion: PSA Results reviewed with patient. Patient reassured. Denies hematuria, dysuria, nocturia, flank pain, and bothersome frequency or urgency. No new urinary complaints. Denies leakage. Follow up in 6 months with PSA prior. At that point we will decide if can be switched to yearly follow ups.     SAMMY:25, AUA:8    Assessment:    No diagnosis found.    Bj Herndon is a 58 y.o. male here for FUV     Plan:   1) Follow up in 6 months with PSA prior.  All questions and concerns were addressed. Patient verbalizes understanding and has no other questions at this time.     Scribe Attestation  By signing my name below, IRenetta Scribe   attest that this documentation has been prepared under the direction and in the presence of Prashant Shepard MD.    "

## 2024-10-10 ENCOUNTER — OFFICE VISIT (OUTPATIENT)
Dept: UROLOGY | Facility: HOSPITAL | Age: 59
End: 2024-10-10
Payer: COMMERCIAL

## 2024-10-10 DIAGNOSIS — C61 MALIGNANT NEOPLASM OF PROSTATE (MULTI): ICD-10-CM

## 2024-10-10 PROCEDURE — 99213 OFFICE O/P EST LOW 20 MIN: CPT | Performed by: UROLOGY

## 2024-10-10 PROCEDURE — G2211 COMPLEX E/M VISIT ADD ON: HCPCS | Performed by: UROLOGY

## 2024-11-04 ENCOUNTER — PATIENT MESSAGE (OUTPATIENT)
Dept: PRIMARY CARE | Facility: CLINIC | Age: 59
End: 2024-11-04
Payer: COMMERCIAL

## 2024-11-04 ENCOUNTER — TELEPHONE (OUTPATIENT)
Dept: SCHEDULING | Age: 59
End: 2024-11-04
Payer: COMMERCIAL

## 2024-11-04 DIAGNOSIS — B02.9 HERPES ZOSTER WITHOUT COMPLICATION: Primary | ICD-10-CM

## 2024-11-04 RX ORDER — VALACYCLOVIR HYDROCHLORIDE 1 G/1
1000 TABLET, FILM COATED ORAL 3 TIMES DAILY
Qty: 21 TABLET | Refills: 0 | Status: SHIPPED | OUTPATIENT
Start: 2024-11-04 | End: 2024-11-11

## 2024-11-04 NOTE — PATIENT COMMUNICATION
I spoke with Bj.  Onset of discomfort on Sat night into Sun morning.  Feels rough with classic appearing bumps or vesicles.  No urinary symptoms , no change in bowel habits. Discomfort around anus, perineum and scrotum.   To start valtrex 1gm 3x daily.  To keep follow up on Wed.  Pain is not severe.  Will use OTC ibuprofen as needed.

## 2024-11-04 NOTE — TELEPHONE ENCOUNTER
I received a  call from Bj Herndon and  concerning scheduling a visit  patient allowed me to help schedule                  Francis Lora MA

## 2024-11-06 ENCOUNTER — OFFICE VISIT (OUTPATIENT)
Dept: PRIMARY CARE | Facility: CLINIC | Age: 59
End: 2024-11-06
Payer: COMMERCIAL

## 2024-11-06 VITALS
HEART RATE: 67 BPM | WEIGHT: 153.4 LBS | DIASTOLIC BLOOD PRESSURE: 71 MMHG | OXYGEN SATURATION: 98 % | BODY MASS INDEX: 23.32 KG/M2 | TEMPERATURE: 98.1 F | SYSTOLIC BLOOD PRESSURE: 130 MMHG

## 2024-11-06 DIAGNOSIS — B02.9 HERPES ZOSTER WITHOUT COMPLICATION: Primary | ICD-10-CM

## 2024-11-06 PROCEDURE — 3075F SYST BP GE 130 - 139MM HG: CPT | Performed by: INTERNAL MEDICINE

## 2024-11-06 PROCEDURE — 99213 OFFICE O/P EST LOW 20 MIN: CPT | Performed by: INTERNAL MEDICINE

## 2024-11-06 PROCEDURE — 3078F DIAST BP <80 MM HG: CPT | Performed by: INTERNAL MEDICINE

## 2024-11-06 RX ORDER — GABAPENTIN 300 MG/1
300 CAPSULE ORAL 2 TIMES DAILY
Qty: 60 CAPSULE | Refills: 1 | Status: SHIPPED | OUTPATIENT
Start: 2024-11-06

## 2024-11-06 NOTE — PROGRESS NOTES
Chief Complaint:   Follow-up (shingles)     History Of Present Illness:    Bj Herndon is a 59 y.o. male with active medical problems outlined below who presents for evaluation and management of painful blistering rash on left buttock near anus and perineum.     Rash and discomfort present on Monday.  Red, vesicular rash on left buttock , perineum and scrotum. No fever, no dysuria.  Progressed from Mon-Tuesday then leveled off   Started valtrex on Monday.   Some tylenol or advil for pain is sufficient.      Patient Active Problem List   Diagnosis    Abdominal discomfort    Prostate cancer (Multi)    Hypertension    Colon, diverticulosis    Diarrhea    Elevated lipids    Hypothyroidism    IBS (irritable bowel syndrome)    Anxiety disorder, unspecified    Gallbladder polyp    Routine health maintenance    Mucoid cyst of joint    Herpes zoster without complication       Current Outpatient Medications:     amLODIPine (Norvasc) 5 mg tablet, Take 1 tablet (5 mg) by mouth once daily., Disp: 30 tablet, Rfl: 11    ergocalciferol, vitamin D2, (VITAMIN D2 ORAL), Take by mouth., Disp: , Rfl:     valACYclovir (Valtrex) 1 gram tablet, Take 1 tablet (1,000 mg) by mouth 3 times a day for 7 days., Disp: 21 tablet, Rfl: 0    gabapentin (Neurontin) 300 mg capsule, Take 1 capsule (300 mg) by mouth 2 times a day., Disp: 60 capsule, Rfl: 1  Prednisone  Social History     Tobacco Use    Smoking status: Never    Smokeless tobacco: Never   Vaping Use    Vaping status: Never Used   Substance Use Topics    Alcohol use: Not Currently     Alcohol/week: 2.0 standard drinks of alcohol     Types: 2 Cans of beer per week     Comment: occasional    Drug use: Never         All pertinent aspects of medical and surgical history were reviewed and updated in chart    Review of Systems   Pertinent positives in review of systems outlined above.  Complete ROS otherwise negative.        Last Recorded Vitals:  No data found.         Physical Exam  HENT:       Mouth/Throat:      Pharynx: Oropharynx is clear.   Eyes:      Extraocular Movements: Extraocular movements intact.      Conjunctiva/sclera: Conjunctivae normal.      Pupils: Pupils are equal, round, and reactive to light.   Genitourinary:     Comments: Erythematous vesicular rash, some vesicles scaled along left inner buttock and perineum             The 10-year ASCVD risk score (Hilda TIPTON, et al., 2019) is: 9.4%    Values used to calculate the score:      Age: 59 years      Sex: Male      Is Non- : No      Diabetic: No      Tobacco smoker: No      Systolic Blood Pressure: 130 mmHg      Is BP treated: Yes      HDL Cholesterol: 65.6 mg/dL      Total Cholesterol: 203 mg/dL      Assessment/Plan   Problem List Items Addressed This Visit       Herpes zoster without complication - Primary     Exam consistent with shingles involving S3 dermatome.  To finish valtrex.  OTC tylenol/ibuprofen are current sufficient for pain. Given script for gabapentin if pain increases         Relevant Medications    gabapentin (Neurontin) 300 mg capsule       A total of 20 minutes was spent reviewing the chart and recent testing and discussing plan of care.         Domingo Rowland MD

## 2024-11-10 PROBLEM — B02.9 HERPES ZOSTER WITHOUT COMPLICATION: Status: ACTIVE | Noted: 2024-11-10

## 2024-11-10 NOTE — ASSESSMENT & PLAN NOTE
Exam consistent with shingles involving S3 dermatome.  To finish valtrex.  OTC tylenol/ibuprofen are current sufficient for pain. Given script for gabapentin if pain increases

## 2025-01-12 DIAGNOSIS — I10 BENIGN ESSENTIAL HTN: ICD-10-CM

## 2025-01-13 RX ORDER — AMLODIPINE BESYLATE 5 MG/1
5 TABLET ORAL DAILY
Qty: 30 TABLET | Refills: 11 | OUTPATIENT
Start: 2025-01-13

## 2025-01-21 DIAGNOSIS — I10 BENIGN ESSENTIAL HTN: ICD-10-CM

## 2025-01-21 RX ORDER — AMLODIPINE BESYLATE 5 MG/1
5 TABLET ORAL DAILY
Qty: 90 TABLET | Refills: 0 | Status: SHIPPED | OUTPATIENT
Start: 2025-01-21

## 2025-01-29 NOTE — ASSESSMENT & PLAN NOTE
-BP Goal <130/80.  -Continue amlodipine 5 mg daily.  -Patient and I discussed dietary and lifestyle modifications, including DASH diet.

## 2025-01-29 NOTE — PROGRESS NOTES
"Subjective   Reason for Visit: Bj Herndon is an 59 y.o. male here to establish care with me.       Reviewed all medications by prescribing practitioner or clinical pharmacist (such as prescriptions, OTCs, herbal therapies and supplements) and documented in the medical record.    HPI   HISTORY 9/13/24-from prior PCP.  Levothyroxine was discontinued over the past year.  No change since stopping the thyroid medication - not tired, not cold   Digestive issues are ongoing - \"way better\" than in the past, able to regain some weight - down to 145. Having intermittent soft stools, not watery.  Going once per day, not constipated.  Some bloating.  Blood in stool.  Met with MD at [facility] - Xray showed calcified granuloma in lung.  Calcified granuloma present on CAC from 4/24/22  Using compression stockings for varicosities in legs and legs less achy   He has developed a cyst at the distal interphalangeal joint in his right second digit.  The cyst is not painful.  He is interested in meeting with orthopedics to have it removed.    Incremental Updates 1/30/2025:  This is the first time I am meeting the patient.  Patient is here for follow-up hypertension and subclinical hypothyroidism.  He has had elevated TSH levels in the past, but normal free thyroxine levels.  He tested negative for TPO antibodies. Patient denies chest pain/pressure/tightness, shortness of breath, orthopnea, paroxysmal nocturnal dyspnea, lower limb edema, blurred vision, lightheadedness/dizziness, presyncope, syncope.  Patient has not taken levothyroxine for the past year.      Patient Care Team:  Manuel Yuan MD as PCP - General (Family Medicine)  Domingo Rowland MD as PCP - [facility] PCP  Joellen Goodman MD (Internal Medicine)       Review of Systems   Constitutional:  Negative for chills, fever and unexpected weight change.   HENT:  Negative for rhinorrhea.    Eyes:  Negative for pain.   Respiratory:  Negative for shortness of breath.  " "  Cardiovascular:  Negative for chest pain.   Gastrointestinal:  Negative for abdominal pain.   Genitourinary:  Negative for hematuria.   Skin:  Negative for rash.   Neurological:  Negative for dizziness, syncope and light-headedness.   Psychiatric/Behavioral:  Negative for behavioral problems and suicidal ideas.        Objective   Vitals:  /76   Pulse 59   Ht 1.727 m (5' 8\")   Wt 71.2 kg (157 lb)   SpO2 99%   BMI 23.87 kg/m²       Physical Exam  Vitals reviewed.   Constitutional:       General: He is not in acute distress.  HENT:      Head: Normocephalic.      Right Ear: External ear normal.      Left Ear: External ear normal.      Nose: No rhinorrhea.      Mouth/Throat:      Mouth: Mucous membranes are moist.   Eyes:      Conjunctiva/sclera: Conjunctivae normal.   Cardiovascular:      Rate and Rhythm: Normal rate and regular rhythm.      Heart sounds: No murmur heard.     No friction rub. No gallop.   Pulmonary:      Effort: No respiratory distress.      Breath sounds: No wheezing, rhonchi or rales.   Abdominal:      General: Bowel sounds are normal. There is no distension.      Palpations: Abdomen is soft.      Tenderness: There is no abdominal tenderness.   Musculoskeletal:      Cervical back: Neck supple.      Right lower leg: No edema.      Left lower leg: No edema.   Skin:     General: Skin is warm and dry.      Capillary Refill: Capillary refill takes less than 2 seconds.   Neurological:      Mental Status: He is alert.      Gait: Gait normal.         Problem List Items Addressed This Visit          Medium    Prostate cancer (Multi)    Current Assessment & Plan     -I reviewed outside urology notes/labs/imaging studies.  -History of prostate cancer.. shant 3+4 - Radiation - brachytherapy, ([facility], Dr. Eason, Dr Rosales).  Now following Dr. Shepard of .  -Continue to monitor PSA levels every 6 months.           Hypertension - Primary    Current Assessment & Plan     -BP Goal " <130/80.  -Continue amlodipine 5 mg daily.  -Patient and I discussed dietary and lifestyle modifications, including DASH diet.         Relevant Medications    amLODIPine (Norvasc) 5 mg tablet    Elevated lipids    Current Assessment & Plan     HDL is very high, LDL is within an acceptable range.  Coronary artery calcium score within the past 2 years was low.  Will continue with lifestyle changes including plant-based whole food diet and exercise.  Lipids to be repeated again in 1 year.         Hypothyroidism    Current Assessment & Plan     Digestive symptoms may have improved slightly off of the levothyroxine.  Bj is feeling well otherwise and does not endorse symptoms of hypothyroidism.  Follow-up thyroid function studies will be included with his next PSA in a few weeks         Relevant Orders    TSH    T3, free    T4, free     Other Visit Diagnoses       Encounter for medication monitoring        Relevant Orders    CBC and Auto Differential    Comprehensive Metabolic Panel    Routine general medical examination at a health care facility        Relevant Orders    CBC and Auto Differential    Comprehensive Metabolic Panel    Lipid panel    Dyslipidemia        Relevant Orders    Lipid panel    Hyperglycemia        Relevant Orders    Hemoglobin A1c                Medical decision making during this visit had more complexity inherent to evaluation and management associated with medical care services that serve as the continuing focal point for all needed health care services and/or with medical care services that are part of ongoing care related to a patient's single, serious condition or a complex condition.     Follow-up every 4 months for hypertension and hypothyroidism.

## 2025-01-30 ENCOUNTER — APPOINTMENT (OUTPATIENT)
Dept: PRIMARY CARE | Facility: CLINIC | Age: 60
End: 2025-01-30
Payer: COMMERCIAL

## 2025-01-30 VITALS
HEART RATE: 59 BPM | WEIGHT: 157 LBS | OXYGEN SATURATION: 99 % | SYSTOLIC BLOOD PRESSURE: 164 MMHG | DIASTOLIC BLOOD PRESSURE: 76 MMHG | BODY MASS INDEX: 23.79 KG/M2 | HEIGHT: 68 IN

## 2025-01-30 DIAGNOSIS — Z00.00 ROUTINE GENERAL MEDICAL EXAMINATION AT A HEALTH CARE FACILITY: ICD-10-CM

## 2025-01-30 DIAGNOSIS — C61 PROSTATE CANCER (MULTI): ICD-10-CM

## 2025-01-30 DIAGNOSIS — I10 PRIMARY HYPERTENSION: Primary | ICD-10-CM

## 2025-01-30 DIAGNOSIS — E78.5 DYSLIPIDEMIA: ICD-10-CM

## 2025-01-30 DIAGNOSIS — R73.9 HYPERGLYCEMIA: ICD-10-CM

## 2025-01-30 DIAGNOSIS — Z51.81 ENCOUNTER FOR MEDICATION MONITORING: ICD-10-CM

## 2025-01-30 DIAGNOSIS — E03.9 HYPOTHYROIDISM, UNSPECIFIED TYPE: ICD-10-CM

## 2025-01-30 DIAGNOSIS — E78.5 ELEVATED LIPIDS: ICD-10-CM

## 2025-01-30 PROCEDURE — 3078F DIAST BP <80 MM HG: CPT | Performed by: STUDENT IN AN ORGANIZED HEALTH CARE EDUCATION/TRAINING PROGRAM

## 2025-01-30 PROCEDURE — 1036F TOBACCO NON-USER: CPT | Performed by: STUDENT IN AN ORGANIZED HEALTH CARE EDUCATION/TRAINING PROGRAM

## 2025-01-30 PROCEDURE — 3008F BODY MASS INDEX DOCD: CPT | Performed by: STUDENT IN AN ORGANIZED HEALTH CARE EDUCATION/TRAINING PROGRAM

## 2025-01-30 PROCEDURE — 99214 OFFICE O/P EST MOD 30 MIN: CPT | Performed by: STUDENT IN AN ORGANIZED HEALTH CARE EDUCATION/TRAINING PROGRAM

## 2025-01-30 PROCEDURE — 3077F SYST BP >= 140 MM HG: CPT | Performed by: STUDENT IN AN ORGANIZED HEALTH CARE EDUCATION/TRAINING PROGRAM

## 2025-01-30 RX ORDER — AMLODIPINE BESYLATE 5 MG/1
5 TABLET ORAL DAILY
Qty: 90 TABLET | Refills: 3 | Status: SHIPPED | OUTPATIENT
Start: 2025-01-30

## 2025-01-30 ASSESSMENT — ENCOUNTER SYMPTOMS
HEMATURIA: 0
SHORTNESS OF BREATH: 0
UNEXPECTED WEIGHT CHANGE: 0
FEVER: 0
EYE PAIN: 0
ABDOMINAL PAIN: 0
LOSS OF SENSATION IN FEET: 0
CHILLS: 0
DIZZINESS: 0
RHINORRHEA: 0
DEPRESSION: 0
LIGHT-HEADEDNESS: 0
OCCASIONAL FEELINGS OF UNSTEADINESS: 0

## 2025-01-30 ASSESSMENT — PATIENT HEALTH QUESTIONNAIRE - PHQ9
2. FEELING DOWN, DEPRESSED OR HOPELESS: NOT AT ALL
SUM OF ALL RESPONSES TO PHQ9 QUESTIONS 1 AND 2: 0
1. LITTLE INTEREST OR PLEASURE IN DOING THINGS: NOT AT ALL
1. LITTLE INTEREST OR PLEASURE IN DOING THINGS: NOT AT ALL
SUM OF ALL RESPONSES TO PHQ9 QUESTIONS 1 AND 2: 0
2. FEELING DOWN, DEPRESSED OR HOPELESS: NOT AT ALL

## 2025-01-30 NOTE — ASSESSMENT & PLAN NOTE
-I reviewed outside urology notes/labs/imaging studies.  -History of prostate cancer.. shant 3+4 - Radiation - brachytherapy, ([facility], Dr. Eason, Dr Rosales).  Now following Dr. Shepard of .  -Continue to monitor PSA levels every 6 months.

## 2025-01-31 LAB
ALBUMIN SERPL-MCNC: 4.6 G/DL (ref 3.6–5.1)
ALP SERPL-CCNC: 44 U/L (ref 35–144)
ALT SERPL-CCNC: 20 U/L (ref 9–46)
ANION GAP SERPL CALCULATED.4IONS-SCNC: 10 MMOL/L (CALC) (ref 7–17)
AST SERPL-CCNC: 20 U/L (ref 10–35)
BASOPHILS # BLD AUTO: 30 CELLS/UL (ref 0–200)
BASOPHILS NFR BLD AUTO: 0.8 %
BILIRUB SERPL-MCNC: 0.5 MG/DL (ref 0.2–1.2)
BUN SERPL-MCNC: 14 MG/DL (ref 7–25)
CALCIUM SERPL-MCNC: 9.7 MG/DL (ref 8.6–10.3)
CHLORIDE SERPL-SCNC: 104 MMOL/L (ref 98–110)
CHOLEST SERPL-MCNC: 213 MG/DL
CHOLEST/HDLC SERPL: 4 (CALC)
CO2 SERPL-SCNC: 26 MMOL/L (ref 20–32)
CREAT SERPL-MCNC: 0.86 MG/DL (ref 0.7–1.35)
EGFRCR SERPLBLD CKD-EPI 2021: 99 ML/MIN/1.73M2
EOSINOPHIL # BLD AUTO: 122 CELLS/UL (ref 15–500)
EOSINOPHIL NFR BLD AUTO: 3.3 %
ERYTHROCYTE [DISTWIDTH] IN BLOOD BY AUTOMATED COUNT: 11.8 % (ref 11–15)
EST. AVERAGE GLUCOSE BLD GHB EST-MCNC: 114 MG/DL
EST. AVERAGE GLUCOSE BLD GHB EST-SCNC: 6.3 MMOL/L
GLUCOSE SERPL-MCNC: 126 MG/DL (ref 65–99)
HBA1C MFR BLD: 5.6 % OF TOTAL HGB
HCT VFR BLD AUTO: 42.3 % (ref 38.5–50)
HDLC SERPL-MCNC: 53 MG/DL
HGB BLD-MCNC: 14.5 G/DL (ref 13.2–17.1)
LDLC SERPL CALC-MCNC: 137 MG/DL (CALC)
LYMPHOCYTES # BLD AUTO: 1143 CELLS/UL (ref 850–3900)
LYMPHOCYTES NFR BLD AUTO: 30.9 %
MCH RBC QN AUTO: 33.6 PG (ref 27–33)
MCHC RBC AUTO-ENTMCNC: 34.3 G/DL (ref 32–36)
MCV RBC AUTO: 97.9 FL (ref 80–100)
MONOCYTES # BLD AUTO: 355 CELLS/UL (ref 200–950)
MONOCYTES NFR BLD AUTO: 9.6 %
NEUTROPHILS # BLD AUTO: 2050 CELLS/UL (ref 1500–7800)
NEUTROPHILS NFR BLD AUTO: 55.4 %
NONHDLC SERPL-MCNC: 160 MG/DL (CALC)
PLATELET # BLD AUTO: 284 THOUSAND/UL (ref 140–400)
PMV BLD REES-ECKER: 9.3 FL (ref 7.5–12.5)
POTASSIUM SERPL-SCNC: 4.2 MMOL/L (ref 3.5–5.3)
PROT SERPL-MCNC: 6.9 G/DL (ref 6.1–8.1)
RBC # BLD AUTO: 4.32 MILLION/UL (ref 4.2–5.8)
SODIUM SERPL-SCNC: 140 MMOL/L (ref 135–146)
T3FREE SERPL-MCNC: 3.3 PG/ML (ref 2.3–4.2)
T4 FREE SERPL-MCNC: 1 NG/DL (ref 0.8–1.8)
TRIGL SERPL-MCNC: 116 MG/DL
TSH SERPL-ACNC: 5.72 MIU/L (ref 0.4–4.5)
WBC # BLD AUTO: 3.7 THOUSAND/UL (ref 3.8–10.8)

## 2025-02-24 ENCOUNTER — TELEPHONE (OUTPATIENT)
Dept: SCHEDULING | Age: 60
End: 2025-02-24
Payer: COMMERCIAL

## 2025-04-16 LAB — PSA SERPL-MCNC: 0.1 NG/ML

## 2025-04-17 ENCOUNTER — TELEMEDICINE (OUTPATIENT)
Dept: UROLOGY | Facility: HOSPITAL | Age: 60
End: 2025-04-17
Payer: COMMERCIAL

## 2025-04-17 DIAGNOSIS — C61 MALIGNANT NEOPLASM OF PROSTATE (MULTI): ICD-10-CM

## 2025-04-17 PROCEDURE — 99213 OFFICE O/P EST LOW 20 MIN: CPT | Performed by: UROLOGY

## 2025-04-17 PROCEDURE — G2211 COMPLEX E/M VISIT ADD ON: HCPCS | Performed by: UROLOGY

## 2025-04-17 NOTE — PROGRESS NOTES
Virtual or Telephone Consent    An interactive audio and video telecommunication system which permits real time communications between the patient (at the originating site) and provider (at the distant site) was utilized to provide this telehealth service.   Verbal consent was requested and obtained from Bj Herndon on this date, 04/17/25 for a telehealth visit and the patient's location was confirmed at the time of the visit.    FUV    Last seen - 10/10/24     HISTORY OF PRESENT ILLNESS:   Bj Herndon is a 59 y.o. male who is being seen today for 6 MONTH FUV with PSA results   -history of prostate cancer, diagnosed with GS 3+4=7 in 11/2019 treated with brachytherapy in 2019    PSA trend:  Lab Results   Component Value Date    PSA 0.10 04/15/2025    PSA 0.17 09/30/2024    PSA 0.29 02/26/2024    PSA 0.32 08/28/2023    PSA 0.53 02/01/2023    PSA 0.61 07/13/2022    PSA 1.01 12/02/2021         PAST MEDICAL HISTORY:  Past Medical History:   Diagnosis Date    Disease of thyroid gland     hypothyroidism - diagnosed on routine blood work    Elevated LDL cholesterol level     CAC 4 -2/24/22    Gallbladder polyp     Hypertension     Prostate cancer (Multi) 11/2019    shant 3+4 - Radiation - brachytherapy, ([facility], Dr. Eason, Dr Rosales) Dr. Shepard       PAST SURGICAL HISTORY:  Past Surgical History:   Procedure Laterality Date    OTHER SURGICAL HISTORY  09/16/2021    Vasectomy    OTHER SURGICAL HISTORY      Hernia repair - right inguinal - 1 yo    OTHER SURGICAL HISTORY  11/2019    Prostate brachytherapy        ALLERGIES:   Allergies   Allergen Reactions    Prednisone Confusion     Treatment for otitis followed by hearing loss - agitated,coulnd't sleep        MEDICATIONS:   Current Outpatient Medications   Medication Instructions    amLODIPine (NORVASC) 5 mg, oral, Daily    ergocalciferol, vitamin D2, (VITAMIN D2 ORAL) Take by mouth.        PHYSICAL EXAM:  There were no vitals taken for this  "visit.  Constitutional: Patient appears well-developed and well-nourished. No distress.    Pulmonary/Chest: Effort normal. No respiratory distress.   Abdominal: Soft, ND NT  : WNL  Musculoskeletal: Normal range of motion.    Neurological: Alert and oriented to person, place, and time.  Psychiatric: Normal mood and affect. Behavior is normal. Thought content normal.      Labs:  No results found for: \"TESTOSTERONE\"  Lab Results   Component Value Date    PSA 0.10 04/15/2025     Lab Results   Component Value Date    PSA 0.10 04/15/2025    PSA 0.17 09/30/2024    PSA 0.29 02/26/2024    PSA 0.32 08/28/2023    PSA 0.53 02/01/2023    PSA 0.61 07/13/2022       No components found for: \"CBC\"  Lab Results   Component Value Date    CREATININE 0.86 01/30/2025     No components found for: \"TESTOTMS\"  No results found for: \"TESTF\"    Imaging:    Discussion: PSA Results reviewed with patient. Patient reassured. Denies hematuria, dysuria, nocturia, flank pain, and bothersome frequency or urgency. No new urinary complaints. Denies leakage. Erections working well. Follow up in 6 months with PSA prior.   SAMMY:25, AUA:8    Assessment:    No diagnosis found.    Bj Herndon is a 59 y.o. male here for FUV     Plan:   1) Follow up in 6 months with PSA prior.  All questions and concerns were addressed. Patient verbalizes understanding and has no other questions at this time.     Scribe Attestation  By signing my name below, IRenetta Scribe   attest that this documentation has been prepared under the direction and in the presence of Prashant Shepard MD.    "

## 2025-05-08 ENCOUNTER — APPOINTMENT (OUTPATIENT)
Dept: PRIMARY CARE | Facility: CLINIC | Age: 60
End: 2025-05-08
Payer: COMMERCIAL

## 2025-09-03 PROBLEM — F41.9 ANXIETY DISORDER, UNSPECIFIED: Status: RESOLVED | Noted: 2023-02-21 | Resolved: 2025-09-03

## 2025-09-03 PROBLEM — K57.30 COLON, DIVERTICULOSIS: Status: RESOLVED | Noted: 2023-02-21 | Resolved: 2025-09-03

## 2025-09-03 PROBLEM — Z00.00 ROUTINE HEALTH MAINTENANCE: Status: RESOLVED | Noted: 2024-03-13 | Resolved: 2025-09-03

## 2025-09-03 PROBLEM — R19.7 DIARRHEA: Status: RESOLVED | Noted: 2023-02-21 | Resolved: 2025-09-03

## 2025-09-03 PROBLEM — M67.40 MUCOID CYST OF JOINT: Status: RESOLVED | Noted: 2024-09-13 | Resolved: 2025-09-03

## 2025-09-03 PROBLEM — K82.4 GALLBLADDER POLYP: Status: RESOLVED | Noted: 2024-03-13 | Resolved: 2025-09-03

## 2025-09-03 PROBLEM — B02.9 HERPES ZOSTER WITHOUT COMPLICATION: Status: RESOLVED | Noted: 2024-11-10 | Resolved: 2025-09-03

## 2025-09-03 PROBLEM — R10.9 ABDOMINAL DISCOMFORT: Status: RESOLVED | Noted: 2023-02-21 | Resolved: 2025-09-03

## 2025-09-03 ASSESSMENT — ENCOUNTER SYMPTOMS
CHILLS: 0
SHORTNESS OF BREATH: 0
DIZZINESS: 0
ABDOMINAL PAIN: 0
EYE PAIN: 0
FEVER: 0
RHINORRHEA: 0
UNEXPECTED WEIGHT CHANGE: 0
LIGHT-HEADEDNESS: 0
HEMATURIA: 0

## 2025-09-04 ENCOUNTER — APPOINTMENT (OUTPATIENT)
Dept: PRIMARY CARE | Facility: CLINIC | Age: 60
End: 2025-09-04
Payer: COMMERCIAL

## 2025-09-04 PROBLEM — K22.2 ESOPHAGEAL STRICTURE: Status: ACTIVE | Noted: 2025-09-04

## 2025-09-04 ASSESSMENT — ENCOUNTER SYMPTOMS
LOSS OF SENSATION IN FEET: 0
OCCASIONAL FEELINGS OF UNSTEADINESS: 0
DEPRESSION: 0

## 2026-01-08 ENCOUNTER — APPOINTMENT (OUTPATIENT)
Dept: PRIMARY CARE | Facility: CLINIC | Age: 61
End: 2026-01-08
Payer: COMMERCIAL